# Patient Record
Sex: MALE | Race: ASIAN | NOT HISPANIC OR LATINO | URBAN - METROPOLITAN AREA
[De-identification: names, ages, dates, MRNs, and addresses within clinical notes are randomized per-mention and may not be internally consistent; named-entity substitution may affect disease eponyms.]

---

## 2018-01-22 ENCOUNTER — EMERGENCY (EMERGENCY)
Facility: HOSPITAL | Age: 38
LOS: 1 days | Discharge: HOME | End: 2018-01-22
Admitting: EMERGENCY MEDICINE
Payer: OTHER MISCELLANEOUS

## 2018-01-22 VITALS
SYSTOLIC BLOOD PRESSURE: 130 MMHG | DIASTOLIC BLOOD PRESSURE: 80 MMHG | RESPIRATION RATE: 18 BRPM | TEMPERATURE: 98 F | OXYGEN SATURATION: 99 % | HEART RATE: 77 BPM

## 2018-01-22 VITALS
HEART RATE: 82 BPM | SYSTOLIC BLOOD PRESSURE: 121 MMHG | DIASTOLIC BLOOD PRESSURE: 90 MMHG | OXYGEN SATURATION: 100 % | RESPIRATION RATE: 16 BRPM

## 2018-01-22 PROCEDURE — 73564 X-RAY EXAM KNEE 4 OR MORE: CPT | Mod: 26,RT

## 2018-01-22 PROCEDURE — 99282 EMERGENCY DEPT VISIT SF MDM: CPT

## 2018-01-22 PROCEDURE — 73562 X-RAY EXAM OF KNEE 3: CPT | Mod: 26,RT

## 2018-01-22 NOTE — ED PROVIDER NOTE - MEDICAL DECISION MAKING DETAILS
Pt is a 38 y/o M smoker PMHx HLD p/w right knee pain 3 hours ago. -- likely knee strain, r/o fracture -- xray, nsaid, rice

## 2018-01-22 NOTE — ED PROVIDER NOTE - PLAN OF CARE
Rest, drink plenty of fluids.  Advance activity as tolerated.  Continue all previously prescribed medications as directed.  Take Motrin 600 mg (three 200 mg over the counter pills) every 8 hours as needed for moderate pain -- take with food. Take Tylenol 650mg (Two 325 mg pills) every 4-6 hours as needed for pain.  Keep extremity elevated and wrapped.  Apply cool compresses to affected area for 15 minutes, 3-4 times per day. Follow up with your primary care physician and orthopedics (referral list provided)  in 48-72 hours- bring copies of your results.  Return to the ER for worsening or persistent symptoms, and/or ANY NEW OR CONCERNING SYMPTOMS. If you have issues obtaining follow up, please call: [phone] to obtain a doctor or specialist who takes your insurance in your area.

## 2018-01-22 NOTE — ED PROVIDER NOTE - CARE PLAN
Principal Discharge DX:	Knee pain, right  Assessment and plan of treatment:	Rest, drink plenty of fluids.  Advance activity as tolerated.  Continue all previously prescribed medications as directed.  Take Motrin 600 mg (three 200 mg over the counter pills) every 8 hours as needed for moderate pain -- take with food. Take Tylenol 650mg (Two 325 mg pills) every 4-6 hours as needed for pain.  Keep extremity elevated and wrapped.  Apply cool compresses to affected area for 15 minutes, 3-4 times per day. Follow up with your primary care physician and orthopedics (referral list provided)  in 48-72 hours- bring copies of your results.  Return to the ER for worsening or persistent symptoms, and/or ANY NEW OR CONCERNING SYMPTOMS. If you have issues obtaining follow up, please call: [phone] to obtain a doctor or specialist who takes your insurance in your area.

## 2018-01-22 NOTE — ED PROVIDER NOTE - OBJECTIVE STATEMENT
Pt is a 38 y/o M smoker PMHx HLD p/w right knee pain 3 hours ago.  Pt states he was stepping out of bus, twisted on right knee and experienced sudden onset medial knee pain which worsens with walking and standing.  Mildly improves with ice.  Denies any fevers, chills, numbness, weakness, head injury, fall, swelling, redness, illicit drug use.

## 2018-01-22 NOTE — ED PROVIDER NOTE - CHPI ED SYMPTOMS NEG
no numbness/no stiffness/no deformity/no back pain/no weakness/no fever/no abrasion/no bruising/no tingling/no difficulty bearing weight

## 2023-08-16 PROBLEM — E78.5 HYPERLIPIDEMIA, UNSPECIFIED: Chronic | Status: ACTIVE | Noted: 2018-01-22

## 2023-08-18 ENCOUNTER — APPOINTMENT (OUTPATIENT)
Dept: ORTHOPEDIC SURGERY | Facility: CLINIC | Age: 43
End: 2023-08-18
Payer: COMMERCIAL

## 2023-08-18 VITALS — HEIGHT: 67 IN | BODY MASS INDEX: 28.25 KG/M2 | WEIGHT: 180 LBS

## 2023-08-18 DIAGNOSIS — Z78.9 OTHER SPECIFIED HEALTH STATUS: ICD-10-CM

## 2023-08-18 DIAGNOSIS — Z72.0 TOBACCO USE: ICD-10-CM

## 2023-08-18 DIAGNOSIS — E78.00 PURE HYPERCHOLESTEROLEMIA, UNSPECIFIED: ICD-10-CM

## 2023-08-18 DIAGNOSIS — M75.82 OTHER SHOULDER LESIONS, LEFT SHOULDER: ICD-10-CM

## 2023-08-18 PROCEDURE — 99204 OFFICE O/P NEW MOD 45 MIN: CPT

## 2023-08-18 PROCEDURE — 73010 X-RAY EXAM OF SHOULDER BLADE: CPT | Mod: LT

## 2023-08-18 PROCEDURE — 73030 X-RAY EXAM OF SHOULDER: CPT | Mod: LT

## 2023-08-18 RX ORDER — DICLOFENAC SODIUM 75 MG/1
75 TABLET, DELAYED RELEASE ORAL TWICE DAILY
Qty: 60 | Refills: 2 | Status: COMPLETED | COMMUNITY
Start: 2023-08-18 | End: 2023-11-16

## 2023-08-18 NOTE — ASSESSMENT
[FreeTextEntry1] : RC tendonitis/impingement. Activity modification. Ice. Trial of PT. Diclofenac BID prn. Consider SA injection. RTO 6 weeks.

## 2023-08-18 NOTE — HISTORY OF PRESENT ILLNESS
[Gradual] : gradual [8] : 8 [1] : 2 [Dull/Aching] : dull/aching [Sharp] : sharp [Stabbing] : stabbing [Frequent] : frequent [Leisure] : leisure [Rest] : rest [Exercising] : exercising [de-identified] : 8/18/23: 41 yo LHD M presenting with L shoulder pain x months, no specific injury however believes it began while lifting weight. Reports pain anterior with OH reaching and rowing with weight. Denies radiating pain or n/t. Denies prior shoulder issues. Has not tried any meds.  [] : Post Surgical Visit: no [FreeTextEntry1] : left shoulder

## 2023-08-18 NOTE — PHYSICAL EXAM
[5 ___] : forward flexion 5[unfilled]/5 [5___] : internal rotation 5[unfilled]/5 [There are no fractures, subluxations or dislocations. No significant abnormalities are seen] : There are no fractures, subluxations or dislocations. No significant abnormalities are seen [No bony abnormalities] : No bony abnormalities [] : negative Speed's [FreeTextEntry9] : full ROM

## 2023-10-13 ENCOUNTER — APPOINTMENT (OUTPATIENT)
Dept: ORTHOPEDIC SURGERY | Facility: CLINIC | Age: 43
End: 2023-10-13